# Patient Record
Sex: MALE | Race: WHITE | NOT HISPANIC OR LATINO | Employment: FULL TIME | URBAN - NONMETROPOLITAN AREA
[De-identification: names, ages, dates, MRNs, and addresses within clinical notes are randomized per-mention and may not be internally consistent; named-entity substitution may affect disease eponyms.]

---

## 2019-04-28 ENCOUNTER — OFFICE VISIT (OUTPATIENT)
Dept: URGENT CARE | Facility: CLINIC | Age: 59
End: 2019-04-28
Payer: COMMERCIAL

## 2019-04-28 VITALS
OXYGEN SATURATION: 95 % | SYSTOLIC BLOOD PRESSURE: 142 MMHG | WEIGHT: 231 LBS | RESPIRATION RATE: 14 BRPM | HEIGHT: 72 IN | TEMPERATURE: 98.2 F | DIASTOLIC BLOOD PRESSURE: 94 MMHG | BODY MASS INDEX: 31.29 KG/M2 | HEART RATE: 75 BPM

## 2019-04-28 DIAGNOSIS — L03.211 FACIAL CELLULITIS: ICD-10-CM

## 2019-04-28 DIAGNOSIS — R22.0 FACIAL SWELLING: ICD-10-CM

## 2019-04-28 PROCEDURE — 99204 OFFICE O/P NEW MOD 45 MIN: CPT | Performed by: PHYSICIAN ASSISTANT

## 2019-04-28 RX ORDER — DOXYCYCLINE HYCLATE 100 MG
100 TABLET ORAL 2 TIMES DAILY
Qty: 20 TAB | Refills: 0 | Status: SHIPPED | OUTPATIENT
Start: 2019-04-28 | End: 2019-05-08

## 2019-04-28 RX ORDER — METHYLPREDNISOLONE 4 MG/1
TABLET ORAL
Qty: 21 TAB | Refills: 0 | Status: SHIPPED | OUTPATIENT
Start: 2019-04-28

## 2019-04-28 ASSESSMENT — ENCOUNTER SYMPTOMS
COUGH: 0
EYE PAIN: 0
DIZZINESS: 0
SINUS PAIN: 1
FOCAL WEAKNESS: 0
NAUSEA: 0
SENSORY CHANGE: 0
VOMITING: 0
EYE REDNESS: 0
MYALGIAS: 0
DOUBLE VISION: 0
FEVER: 0
EYE DISCHARGE: 0
PALPITATIONS: 0
SORE THROAT: 0
HEADACHES: 0
NECK PAIN: 0
CHILLS: 0
BLURRED VISION: 0
TINGLING: 0

## 2019-04-28 NOTE — PROGRESS NOTES
Subjective:      Todd Leonardo is a 59 y.o. male who presents with Eye Problem (right eye swelling)            Patient is here with complaints of right cheek swelling below the right eye x 2 days. Patient states he got a facial sun burn the past few days while golfing and noticed a small blister on his right cheek. Yesterday, his right cheek began swelling and became red. He noticed yellow crust draining from the blisters. He denies eye pain or eye discharge. He has had no Fever or chills. He denies headache, vision loss, dizziness, nausea, neck pain or myalgias. He does reports mild right maxillary sinus pain and slight tooth discomfort on the right side.  He denies nasal drainage or congestion. He tried cool compress over right cheek and Benadryl cream with little relief. Patient has hx of Staph infections in the past.      History reviewed. No pertinent past medical history.    History reviewed. No pertinent surgical history.    History reviewed. No pertinent family history.    No Known Allergies    Medications, Allergies, and current problem list reviewed today in Epic    Review of Systems   Constitutional: Negative for chills, fever and malaise/fatigue.   HENT: Positive for sinus pain. Negative for congestion, ear discharge, ear pain and sore throat.    Eyes: Negative for blurred vision, double vision, pain, discharge and redness.   Respiratory: Negative for cough.    Cardiovascular: Negative for chest pain, palpitations and leg swelling.   Gastrointestinal: Negative for nausea and vomiting.   Musculoskeletal: Negative for myalgias and neck pain.   Skin: Positive for rash.        Right cheek redness and swelling    Neurological: Negative for dizziness, tingling, sensory change, focal weakness and headaches.     All other systems reviewed and are negative.          Objective:     /94 (BP Location: Left arm, Patient Position: Sitting)   Pulse 75   Temp 36.8 °C (98.2 °F)   Resp 14   Ht 1.829 m  (6')   Wt 104.8 kg (231 lb)   SpO2 95%   BMI 31.33 kg/m²      Physical Exam   Constitutional: He is oriented to person, place, and time. He appears well-developed and well-nourished. No distress.   HENT:   Head: Normocephalic and atraumatic.       No mastoid tenderness or swelling   Eyes: Pupils are equal, round, and reactive to light. Conjunctivae, EOM and lids are normal. Right eye exhibits no discharge. Left eye exhibits no discharge.   Neck: Neck supple.   Cardiovascular: Normal rate.    Pulmonary/Chest: Effort normal. No respiratory distress.   Lymphadenopathy:     He has no cervical adenopathy.   Neurological: He is alert and oriented to person, place, and time. No cranial nerve deficit.   Psychiatric: He has a normal mood and affect. His behavior is normal. Judgment and thought content normal.               Assessment/Plan:     1. Facial swelling      2. Facial cellulitis    Suspect Staph infection. Sunburn or allergic reaction with bacterial entry into blisters.   - doxycycline (VIBRAMYCIN) 100 MG Tab; Take 1 Tab by mouth 2 times a day for 10 days.  Dispense: 20 Tab; Refill: 0  - MethylPREDNISolone (MEDROL DOSEPAK) 4 MG Tablet Therapy Pack; Take as directed on package. 1 pack.  Dispense: 21 Tab; Refill: 0    Encouraged cool compress.  Advised follow-up with PCP or UC in 24-48 hours if symptoms do not improve. Avoid sun exposure with Antibiotic.  ER precautions discussed including worsening swelling, fevers, headaches, or eye involvement.      Differential diagnoses, Supportive care, and indications for immediate follow-up discussed with patient.   Instructed to return to clinic or nearest emergency department for any change in condition, further concerns, or worsening of symptoms.    The patient demonstrated a good understanding and agreed with the treatment plan.    Ashia Carrasco P.A.-C.